# Patient Record
Sex: FEMALE | Race: WHITE | NOT HISPANIC OR LATINO
[De-identification: names, ages, dates, MRNs, and addresses within clinical notes are randomized per-mention and may not be internally consistent; named-entity substitution may affect disease eponyms.]

---

## 2017-05-08 ENCOUNTER — FORM ENCOUNTER (OUTPATIENT)
Age: 72
End: 2017-05-08

## 2017-05-09 ENCOUNTER — APPOINTMENT (OUTPATIENT)
Dept: MRI IMAGING | Facility: CLINIC | Age: 72
End: 2017-05-09

## 2017-05-09 ENCOUNTER — OUTPATIENT (OUTPATIENT)
Dept: OUTPATIENT SERVICES | Facility: HOSPITAL | Age: 72
LOS: 1 days | End: 2017-05-09
Payer: MEDICARE

## 2017-05-09 DIAGNOSIS — R22.1 LOCALIZED SWELLING, MASS AND LUMP, NECK: ICD-10-CM

## 2017-05-09 DIAGNOSIS — R22.0 LOCALIZED SWELLING, MASS AND LUMP, HEAD: ICD-10-CM

## 2017-05-10 ENCOUNTER — RESULT REVIEW (OUTPATIENT)
Age: 72
End: 2017-05-10

## 2017-05-10 PROCEDURE — A9585: CPT

## 2017-05-10 PROCEDURE — 70553 MRI BRAIN STEM W/O & W/DYE: CPT

## 2017-05-10 PROCEDURE — 82565 ASSAY OF CREATININE: CPT

## 2017-06-16 ENCOUNTER — APPOINTMENT (OUTPATIENT)
Dept: PULMONOLOGY | Facility: CLINIC | Age: 72
End: 2017-06-16

## 2017-06-16 VITALS — DIASTOLIC BLOOD PRESSURE: 78 MMHG | BODY MASS INDEX: 25.02 KG/M2 | WEIGHT: 155 LBS | SYSTOLIC BLOOD PRESSURE: 120 MMHG

## 2017-12-18 ENCOUNTER — APPOINTMENT (OUTPATIENT)
Dept: PULMONOLOGY | Facility: CLINIC | Age: 72
End: 2017-12-18
Payer: MEDICARE

## 2017-12-18 VITALS — DIASTOLIC BLOOD PRESSURE: 78 MMHG | SYSTOLIC BLOOD PRESSURE: 118 MMHG

## 2017-12-18 VITALS — WEIGHT: 153 LBS | BODY MASS INDEX: 24.7 KG/M2

## 2017-12-18 DIAGNOSIS — I51.81 TAKOTSUBO SYNDROME: ICD-10-CM

## 2017-12-18 PROCEDURE — 94060 EVALUATION OF WHEEZING: CPT

## 2017-12-18 PROCEDURE — 94729 DIFFUSING CAPACITY: CPT

## 2017-12-18 PROCEDURE — 85018 HEMOGLOBIN: CPT | Mod: QW

## 2017-12-18 PROCEDURE — 94727 GAS DIL/WSHOT DETER LNG VOL: CPT

## 2017-12-18 PROCEDURE — 99215 OFFICE O/P EST HI 40 MIN: CPT | Mod: 25

## 2017-12-18 PROCEDURE — 94664 DEMO&/EVAL PT USE INHALER: CPT | Mod: 59

## 2018-04-30 ENCOUNTER — APPOINTMENT (OUTPATIENT)
Dept: PULMONOLOGY | Facility: CLINIC | Age: 73
End: 2018-04-30
Payer: MEDICARE

## 2018-04-30 VITALS
RESPIRATION RATE: 14 BRPM | HEART RATE: 67 BPM | SYSTOLIC BLOOD PRESSURE: 118 MMHG | OXYGEN SATURATION: 97 % | DIASTOLIC BLOOD PRESSURE: 72 MMHG

## 2018-04-30 VITALS — WEIGHT: 149 LBS | BODY MASS INDEX: 24.05 KG/M2

## 2018-04-30 DIAGNOSIS — I51.81 TAKOTSUBO SYNDROME: ICD-10-CM

## 2018-04-30 DIAGNOSIS — J45.901 UNSPECIFIED ASTHMA WITH (ACUTE) EXACERBATION: ICD-10-CM

## 2018-04-30 DIAGNOSIS — R09.82 POSTNASAL DRIP: ICD-10-CM

## 2018-04-30 PROCEDURE — 94010 BREATHING CAPACITY TEST: CPT

## 2018-04-30 PROCEDURE — 99214 OFFICE O/P EST MOD 30 MIN: CPT | Mod: 25

## 2018-05-07 ENCOUNTER — FORM ENCOUNTER (OUTPATIENT)
Age: 73
End: 2018-05-07

## 2018-05-08 ENCOUNTER — RESULT REVIEW (OUTPATIENT)
Age: 73
End: 2018-05-08

## 2018-05-08 ENCOUNTER — APPOINTMENT (OUTPATIENT)
Dept: MRI IMAGING | Facility: CLINIC | Age: 73
End: 2018-05-08
Payer: MEDICARE

## 2018-05-08 ENCOUNTER — OUTPATIENT (OUTPATIENT)
Dept: OUTPATIENT SERVICES | Facility: HOSPITAL | Age: 73
LOS: 1 days | End: 2018-05-08
Payer: MEDICARE

## 2018-05-08 DIAGNOSIS — R22.1 LOCALIZED SWELLING, MASS AND LUMP, NECK: ICD-10-CM

## 2018-05-08 DIAGNOSIS — R22.0 LOCALIZED SWELLING, MASS AND LUMP, HEAD: ICD-10-CM

## 2018-05-08 PROCEDURE — 70553 MRI BRAIN STEM W/O & W/DYE: CPT | Mod: 26

## 2018-05-08 PROCEDURE — A9585: CPT

## 2018-05-08 PROCEDURE — 70553 MRI BRAIN STEM W/O & W/DYE: CPT

## 2018-05-08 PROCEDURE — 82565 ASSAY OF CREATININE: CPT

## 2018-10-19 ENCOUNTER — APPOINTMENT (OUTPATIENT)
Dept: PULMONOLOGY | Facility: CLINIC | Age: 73
End: 2018-10-19
Payer: MEDICARE

## 2018-10-19 VITALS
DIASTOLIC BLOOD PRESSURE: 78 MMHG | RESPIRATION RATE: 14 BRPM | WEIGHT: 154 LBS | OXYGEN SATURATION: 97 % | BODY MASS INDEX: 24.86 KG/M2 | SYSTOLIC BLOOD PRESSURE: 114 MMHG | HEART RATE: 68 BPM

## 2018-10-19 PROCEDURE — 99214 OFFICE O/P EST MOD 30 MIN: CPT

## 2018-10-19 RX ORDER — MULTIVIT-MIN/IRON/FOLIC ACID/K 18-600-40
400 CAPSULE ORAL
Refills: 0 | Status: ACTIVE | COMMUNITY

## 2019-04-24 ENCOUNTER — APPOINTMENT (OUTPATIENT)
Dept: PULMONOLOGY | Facility: CLINIC | Age: 74
End: 2019-04-24

## 2019-05-03 ENCOUNTER — APPOINTMENT (OUTPATIENT)
Dept: PULMONOLOGY | Facility: CLINIC | Age: 74
End: 2019-05-03
Payer: MEDICARE

## 2019-05-03 VITALS — SYSTOLIC BLOOD PRESSURE: 120 MMHG | OXYGEN SATURATION: 95 % | HEART RATE: 70 BPM | DIASTOLIC BLOOD PRESSURE: 70 MMHG

## 2019-05-03 VITALS — HEIGHT: 65 IN | BODY MASS INDEX: 26.33 KG/M2 | WEIGHT: 158 LBS

## 2019-05-03 PROCEDURE — 99214 OFFICE O/P EST MOD 30 MIN: CPT | Mod: 25

## 2019-05-03 PROCEDURE — 94664 DEMO&/EVAL PT USE INHALER: CPT | Mod: 59

## 2019-05-03 PROCEDURE — 94060 EVALUATION OF WHEEZING: CPT

## 2019-05-03 NOTE — REASON FOR VISIT
[Follow-Up] : a follow-up visit [Asthma] : asthma [COPD] : COPD [Shortness of Breath] : shortness of Breath

## 2019-05-03 NOTE — REVIEW OF SYSTEMS
[Dyspnea] : dyspnea [As Noted in HPI] : as noted in HPI [Hypertension] : ~T hypertension [Negative] : Pulmonary Hypertension [FreeTextEntry8] : Baseline

## 2019-05-03 NOTE — PHYSICAL EXAM
[General Appearance - Well Developed] : well developed [General Appearance - Well Nourished] : well nourished [Normal Conjunctiva] : the conjunctiva exhibited no abnormalities [Normal Oropharynx] : normal oropharynx [Neck Appearance] : the appearance of the neck was normal [Neck Cervical Mass (___cm)] : no neck mass was observed [Jugular Venous Distention Increased] : there was no jugular-venous distention [Thyroid Diffuse Enlargement] : the thyroid was not enlarged [Heart Rate And Rhythm] : heart rate and rhythm were normal [Heart Sounds] : normal S1 and S2 [Murmurs] : no murmurs present [Arterial Pulses Normal] : the arterial pulses were normal [Edema] : no peripheral edema present [Respiration, Rhythm And Depth] : normal respiratory rhythm and effort [Auscultation Breath Sounds / Voice Sounds] : lungs were clear to auscultation bilaterally [Exaggerated Use Of Accessory Muscles For Inspiration] : no accessory muscle use [Abdomen Tenderness] : non-tender [Abdomen Soft] : soft [] : no hepato-splenomegaly [Abnormal Walk] : normal gait [Nail Clubbing] : no clubbing of the fingernails [Cyanosis, Localized] : no localized cyanosis [Skin Color & Pigmentation] : normal skin color and pigmentation [Oriented To Time, Place, And Person] : oriented to person, place, and time

## 2019-08-04 ENCOUNTER — FORM ENCOUNTER (OUTPATIENT)
Age: 74
End: 2019-08-04

## 2019-08-05 ENCOUNTER — OUTPATIENT (OUTPATIENT)
Dept: OUTPATIENT SERVICES | Facility: HOSPITAL | Age: 74
LOS: 1 days | End: 2019-08-05
Payer: MEDICARE

## 2019-08-05 ENCOUNTER — APPOINTMENT (OUTPATIENT)
Dept: MRI IMAGING | Facility: CLINIC | Age: 74
End: 2019-08-05
Payer: MEDICARE

## 2019-08-05 DIAGNOSIS — R22.0 LOCALIZED SWELLING, MASS AND LUMP, HEAD: ICD-10-CM

## 2019-08-05 PROCEDURE — A9585: CPT

## 2019-08-05 PROCEDURE — 70553 MRI BRAIN STEM W/O & W/DYE: CPT | Mod: 26

## 2019-08-05 PROCEDURE — 70553 MRI BRAIN STEM W/O & W/DYE: CPT

## 2019-08-09 ENCOUNTER — RESULT REVIEW (OUTPATIENT)
Age: 74
End: 2019-08-09

## 2019-11-06 ENCOUNTER — APPOINTMENT (OUTPATIENT)
Dept: PULMONOLOGY | Facility: CLINIC | Age: 74
End: 2019-11-06
Payer: MEDICARE

## 2019-11-06 VITALS — OXYGEN SATURATION: 97 % | DIASTOLIC BLOOD PRESSURE: 78 MMHG | HEART RATE: 73 BPM | SYSTOLIC BLOOD PRESSURE: 110 MMHG

## 2019-11-06 VITALS — HEIGHT: 65 IN | WEIGHT: 151 LBS | BODY MASS INDEX: 25.16 KG/M2

## 2019-11-06 PROCEDURE — 99214 OFFICE O/P EST MOD 30 MIN: CPT | Mod: 25

## 2019-11-06 PROCEDURE — 94010 BREATHING CAPACITY TEST: CPT

## 2019-11-06 RX ORDER — HALOBETASOL PROPIONATE 0.5 MG/G
0.05 OINTMENT TOPICAL
Qty: 45 | Refills: 0 | Status: DISCONTINUED | COMMUNITY
Start: 2017-01-24 | End: 2019-11-06

## 2019-11-06 NOTE — PHYSICAL EXAM
[General Appearance - Well Developed] : well developed [General Appearance - Well Nourished] : well nourished [Normal Conjunctiva] : the conjunctiva exhibited no abnormalities [Normal Oropharynx] : normal oropharynx [Neck Appearance] : the appearance of the neck was normal [Neck Cervical Mass (___cm)] : no neck mass was observed [Jugular Venous Distention Increased] : there was no jugular-venous distention [Thyroid Diffuse Enlargement] : the thyroid was not enlarged [Heart Rate And Rhythm] : heart rate and rhythm were normal [Heart Sounds] : normal S1 and S2 [Murmurs] : no murmurs present [Arterial Pulses Normal] : the arterial pulses were normal [Edema] : no peripheral edema present [Respiration, Rhythm And Depth] : normal respiratory rhythm and effort [Exaggerated Use Of Accessory Muscles For Inspiration] : no accessory muscle use [Auscultation Breath Sounds / Voice Sounds] : lungs were clear to auscultation bilaterally [Abdomen Soft] : soft [Abdomen Tenderness] : non-tender [] : no hepato-splenomegaly [Abnormal Walk] : normal gait [Nail Clubbing] : no clubbing of the fingernails [Cyanosis, Localized] : no localized cyanosis [Skin Color & Pigmentation] : normal skin color and pigmentation [Oriented To Time, Place, And Person] : oriented to person, place, and time

## 2019-11-06 NOTE — REVIEW OF SYSTEMS
[Dyspnea] : dyspnea [As Noted in HPI] : as noted in HPI [Hypertension] : ~T hypertension [Negative] : Sleep Disorder [FreeTextEntry8] : Baseline

## 2020-06-22 ENCOUNTER — APPOINTMENT (OUTPATIENT)
Dept: PULMONOLOGY | Facility: CLINIC | Age: 75
End: 2020-06-22
Payer: MEDICARE

## 2020-06-22 VITALS
BODY MASS INDEX: 25.29 KG/M2 | DIASTOLIC BLOOD PRESSURE: 64 MMHG | WEIGHT: 152 LBS | OXYGEN SATURATION: 98 % | HEART RATE: 67 BPM | SYSTOLIC BLOOD PRESSURE: 108 MMHG

## 2020-06-22 PROCEDURE — 99213 OFFICE O/P EST LOW 20 MIN: CPT

## 2020-06-22 RX ORDER — LEVOCETIRIZINE DIHYDROCHLORIDE 5 MG/1
5 TABLET ORAL
Qty: 90 | Refills: 0 | Status: DISCONTINUED | COMMUNITY
Start: 2017-01-24 | End: 2020-06-22

## 2020-06-22 RX ORDER — TRIAMCINOLONE ACETONIDE 1 MG/ML
0.1 LOTION TOPICAL
Qty: 60 | Refills: 0 | Status: DISCONTINUED | COMMUNITY
Start: 2016-12-16 | End: 2020-06-22

## 2020-06-22 NOTE — PHYSICAL EXAM
[No Acute Distress] : no acute distress [Normal Oropharynx] : normal oropharynx [No Neck Mass] : no neck mass [Normal Rate/Rhythm] : normal rate/rhythm [Normal Appearance] : normal appearance [Normal S1, S2] : normal s1, s2 [No Resp Distress] : no resp distress [No Murmurs] : no murmurs [No Abnormalities] : no abnormalities [Benign] : benign [Clear to Auscultation Bilaterally] : clear to auscultation bilaterally [Normal Gait] : normal gait [No Clubbing] : no clubbing [FROM] : FROM [No Edema] : no edema [No Cyanosis] : no cyanosis [No Focal Deficits] : no focal deficits [Normal Color/ Pigmentation] : normal color/ pigmentation [Oriented x3] : oriented x3 [Normal Affect] : normal affect

## 2020-12-01 DIAGNOSIS — Z01.818 ENCOUNTER FOR OTHER PREPROCEDURAL EXAMINATION: ICD-10-CM

## 2020-12-04 ENCOUNTER — APPOINTMENT (OUTPATIENT)
Dept: DISASTER EMERGENCY | Facility: CLINIC | Age: 75
End: 2020-12-04

## 2020-12-05 LAB — SARS-COV-2 N GENE NPH QL NAA+PROBE: NOT DETECTED

## 2020-12-08 ENCOUNTER — APPOINTMENT (OUTPATIENT)
Dept: PULMONOLOGY | Facility: CLINIC | Age: 75
End: 2020-12-08
Payer: MEDICARE

## 2020-12-08 VITALS — HEART RATE: 74 BPM | DIASTOLIC BLOOD PRESSURE: 80 MMHG | SYSTOLIC BLOOD PRESSURE: 120 MMHG | OXYGEN SATURATION: 99 %

## 2020-12-08 VITALS — BODY MASS INDEX: 25.33 KG/M2 | HEIGHT: 65 IN | WEIGHT: 152 LBS

## 2020-12-08 DIAGNOSIS — Z23 ENCOUNTER FOR IMMUNIZATION: ICD-10-CM

## 2020-12-08 PROCEDURE — 94010 BREATHING CAPACITY TEST: CPT

## 2020-12-08 PROCEDURE — 99214 OFFICE O/P EST MOD 30 MIN: CPT | Mod: 25

## 2020-12-08 RX ORDER — MUPIROCIN 20 MG/G
2 OINTMENT TOPICAL
Qty: 22 | Refills: 0 | Status: DISCONTINUED | COMMUNITY
Start: 2020-09-07

## 2020-12-08 RX ORDER — LEVOTHYROXINE SODIUM 0.07 MG/1
75 TABLET ORAL
Qty: 90 | Refills: 0 | Status: ACTIVE | COMMUNITY
Start: 2020-06-11

## 2020-12-08 RX ORDER — OMEPRAZOLE 40 MG/1
40 CAPSULE, DELAYED RELEASE ORAL
Qty: 90 | Refills: 0 | Status: ACTIVE | COMMUNITY
Start: 2020-06-11

## 2020-12-08 RX ORDER — CEPHALEXIN 500 MG/1
500 CAPSULE ORAL
Qty: 21 | Refills: 0 | Status: DISCONTINUED | COMMUNITY
Start: 2020-09-07

## 2020-12-08 RX ORDER — GABAPENTIN 100 MG/1
100 CAPSULE ORAL
Qty: 90 | Refills: 0 | Status: DISCONTINUED | COMMUNITY
Start: 2020-06-11

## 2020-12-08 RX ORDER — CLOBETASOL PROPIONATE 0.5 MG/G
0.05 CREAM TOPICAL
Qty: 15 | Refills: 0 | Status: DISCONTINUED | COMMUNITY
Start: 2020-10-05

## 2020-12-08 RX ORDER — FLUCONAZOLE 150 MG/1
150 TABLET ORAL
Qty: 1 | Refills: 0 | Status: DISCONTINUED | COMMUNITY
Start: 2020-08-24

## 2020-12-08 RX ORDER — RAMIPRIL 10 MG/1
10 CAPSULE ORAL
Qty: 90 | Refills: 0 | Status: ACTIVE | COMMUNITY
Start: 2020-06-11

## 2021-06-15 ENCOUNTER — APPOINTMENT (OUTPATIENT)
Dept: PULMONOLOGY | Facility: CLINIC | Age: 76
End: 2021-06-15
Payer: MEDICARE

## 2021-06-15 VITALS
HEART RATE: 69 BPM | SYSTOLIC BLOOD PRESSURE: 114 MMHG | DIASTOLIC BLOOD PRESSURE: 70 MMHG | OXYGEN SATURATION: 96 % | WEIGHT: 148 LBS | BODY MASS INDEX: 24.63 KG/M2

## 2021-06-15 PROCEDURE — 99213 OFFICE O/P EST LOW 20 MIN: CPT

## 2021-08-12 ENCOUNTER — RESULT REVIEW (OUTPATIENT)
Age: 76
End: 2021-08-12

## 2021-08-12 ENCOUNTER — APPOINTMENT (OUTPATIENT)
Dept: MRI IMAGING | Facility: CLINIC | Age: 76
End: 2021-08-12
Payer: MEDICARE

## 2021-08-12 ENCOUNTER — OUTPATIENT (OUTPATIENT)
Dept: OUTPATIENT SERVICES | Facility: HOSPITAL | Age: 76
LOS: 1 days | End: 2021-08-12
Payer: MEDICARE

## 2021-08-12 DIAGNOSIS — R22.0 LOCALIZED SWELLING, MASS AND LUMP, HEAD: ICD-10-CM

## 2021-08-12 PROCEDURE — A9585: CPT

## 2021-08-12 PROCEDURE — 70553 MRI BRAIN STEM W/O & W/DYE: CPT | Mod: MH

## 2021-08-12 PROCEDURE — 70553 MRI BRAIN STEM W/O & W/DYE: CPT | Mod: 26,MH

## 2021-08-20 DIAGNOSIS — R22.1 LOCALIZED SWELLING, MASS AND LUMP, HEAD: ICD-10-CM

## 2021-08-20 DIAGNOSIS — R22.0 LOCALIZED SWELLING, MASS AND LUMP, HEAD: ICD-10-CM

## 2021-12-01 ENCOUNTER — APPOINTMENT (OUTPATIENT)
Dept: PULMONOLOGY | Facility: CLINIC | Age: 76
End: 2021-12-01
Payer: MEDICARE

## 2021-12-01 VITALS
WEIGHT: 142 LBS | SYSTOLIC BLOOD PRESSURE: 138 MMHG | DIASTOLIC BLOOD PRESSURE: 80 MMHG | OXYGEN SATURATION: 98 % | HEART RATE: 78 BPM | RESPIRATION RATE: 16 BRPM | BODY MASS INDEX: 22.82 KG/M2 | HEIGHT: 66 IN

## 2021-12-01 DIAGNOSIS — R06.2 WHEEZING: ICD-10-CM

## 2021-12-01 DIAGNOSIS — J20.9 ACUTE BRONCHITIS, UNSPECIFIED: ICD-10-CM

## 2021-12-01 PROCEDURE — 99214 OFFICE O/P EST MOD 30 MIN: CPT

## 2021-12-01 NOTE — HISTORY OF PRESENT ILLNESS
[Former] : former [Current] : current [TextBox_4] : Hx COPD.\par \par Was previously seeing Dr Casetllanos. \par \par On breo daily; spiriva intermitently due to cost. \par \par Was feeling well until about one week ago, developed URI symptoms with cough. No fever. Some wheeze. \par \par Currently vaping. [YearQuit] : 2012

## 2021-12-01 NOTE — PHYSICAL EXAM
[No Acute Distress] : no acute distress [Normal Oropharynx] : normal oropharynx [Supple] : supple [Normal Rate/Rhythm] : normal rate/rhythm [Normal S1, S2] : normal s1, s2 [Benign] : benign [No Clubbing] : no clubbing [Normal Color/ Pigmentation] : normal color/ pigmentation [Oriented x3] : oriented x3 [Normal Mood] : normal mood [Normal Affect] : normal affect [TextBox_68] : scattered wheeze

## 2021-12-13 ENCOUNTER — APPOINTMENT (OUTPATIENT)
Dept: PULMONOLOGY | Facility: CLINIC | Age: 76
End: 2021-12-13

## 2022-06-01 LAB — SARS-COV-2 N GENE NPH QL NAA+PROBE: NOT DETECTED

## 2022-06-03 ENCOUNTER — APPOINTMENT (OUTPATIENT)
Dept: PULMONOLOGY | Facility: CLINIC | Age: 77
End: 2022-06-03
Payer: MEDICARE

## 2022-06-03 VITALS
RESPIRATION RATE: 16 BRPM | HEART RATE: 83 BPM | DIASTOLIC BLOOD PRESSURE: 80 MMHG | OXYGEN SATURATION: 98 % | SYSTOLIC BLOOD PRESSURE: 118 MMHG

## 2022-06-03 VITALS — BODY MASS INDEX: 22.99 KG/M2 | WEIGHT: 138 LBS | HEIGHT: 65 IN

## 2022-06-03 DIAGNOSIS — J45.909 UNSPECIFIED ASTHMA, UNCOMPLICATED: ICD-10-CM

## 2022-06-03 PROCEDURE — 94727 GAS DIL/WSHOT DETER LNG VOL: CPT

## 2022-06-03 PROCEDURE — 99214 OFFICE O/P EST MOD 30 MIN: CPT | Mod: 25

## 2022-06-03 PROCEDURE — 94010 BREATHING CAPACITY TEST: CPT

## 2022-06-03 PROCEDURE — 94729 DIFFUSING CAPACITY: CPT

## 2022-06-03 PROCEDURE — 85018 HEMOGLOBIN: CPT | Mod: QW

## 2022-12-05 ENCOUNTER — APPOINTMENT (OUTPATIENT)
Dept: PULMONOLOGY | Facility: CLINIC | Age: 77
End: 2022-12-05

## 2022-12-05 VITALS
WEIGHT: 140 LBS | RESPIRATION RATE: 16 BRPM | HEART RATE: 73 BPM | SYSTOLIC BLOOD PRESSURE: 132 MMHG | OXYGEN SATURATION: 97 % | HEIGHT: 65.5 IN | BODY MASS INDEX: 23.04 KG/M2 | DIASTOLIC BLOOD PRESSURE: 66 MMHG

## 2022-12-05 DIAGNOSIS — Z78.9 OTHER SPECIFIED HEALTH STATUS: ICD-10-CM

## 2022-12-05 DIAGNOSIS — Z87.898 PERSONAL HISTORY OF OTHER SPECIFIED CONDITIONS: ICD-10-CM

## 2022-12-05 DIAGNOSIS — Z86.69 PERSONAL HISTORY OF OTHER DISEASES OF THE NERVOUS SYSTEM AND SENSE ORGANS: ICD-10-CM

## 2022-12-05 PROCEDURE — 99214 OFFICE O/P EST MOD 30 MIN: CPT

## 2022-12-05 RX ORDER — TIOTROPIUM BROMIDE INHALATION SPRAY 3.12 UG/1
2.5 SPRAY, METERED RESPIRATORY (INHALATION)
Refills: 0 | Status: DISCONTINUED | COMMUNITY
End: 2022-12-05

## 2022-12-05 NOTE — HISTORY OF PRESENT ILLNESS
[Former] : former [Current] : current [TextBox_4] : Hx COPD.\par \par Was previously seeing Dr Castellanos. \par \par On breo daily; not longer taking spiriva. \par \par At baseline. MARIANO with only heavier exertion. No change. No fever. Some wheeze. \par \par Got epidural for sciatic nerve pain about 3 weeks ago. \par \par Currently vaping.\par \par Not exercising. \par \par Still does not want LDCT lung cancer screening. [YearQuit] : 2012

## 2024-01-17 ENCOUNTER — APPOINTMENT (OUTPATIENT)
Dept: PULMONOLOGY | Facility: CLINIC | Age: 79
End: 2024-01-17
Payer: MEDICARE

## 2024-01-17 VITALS — BODY MASS INDEX: 21.83 KG/M2 | HEIGHT: 65 IN | WEIGHT: 131 LBS

## 2024-01-17 VITALS
SYSTOLIC BLOOD PRESSURE: 128 MMHG | OXYGEN SATURATION: 97 % | RESPIRATION RATE: 16 BRPM | HEART RATE: 71 BPM | DIASTOLIC BLOOD PRESSURE: 70 MMHG

## 2024-01-17 DIAGNOSIS — Z87.891 PERSONAL HISTORY OF NICOTINE DEPENDENCE: ICD-10-CM

## 2024-01-17 DIAGNOSIS — J44.9 CHRONIC OBSTRUCTIVE PULMONARY DISEASE, UNSPECIFIED: ICD-10-CM

## 2024-01-17 DIAGNOSIS — R06.02 SHORTNESS OF BREATH: ICD-10-CM

## 2024-01-17 PROCEDURE — G0296 VISIT TO DETERM LDCT ELIG: CPT

## 2024-01-17 PROCEDURE — 99214 OFFICE O/P EST MOD 30 MIN: CPT | Mod: 25

## 2024-01-17 PROCEDURE — 94010 BREATHING CAPACITY TEST: CPT

## 2024-01-17 RX ORDER — GABAPENTIN 300 MG/1
300 CAPSULE ORAL
Refills: 0 | Status: ACTIVE | COMMUNITY

## 2024-01-17 RX ORDER — GABAPENTIN 100 MG
100 TABLET ORAL
Refills: 0 | Status: DISCONTINUED | COMMUNITY
End: 2024-01-17

## 2024-01-17 RX ORDER — BERBERINE CHLOR/SEAWEED/CHROM 500-250 MG
500 CAPSULE ORAL
Refills: 0 | Status: ACTIVE | COMMUNITY

## 2024-01-17 RX ORDER — ASCORBIC ACID 125 MG
TABLET,CHEWABLE ORAL
Refills: 0 | Status: ACTIVE | COMMUNITY

## 2024-01-17 NOTE — HISTORY OF PRESENT ILLNESS
[Former] : former [>= 20 pack years] : >= 20 pack years [TextBox_4] : Hx COPD.  Since last visit here, had covid Dec 2022; saw PCP; cxr done and no pna she reports. Again had another episode of bronchitis in Oct 2023. Went to ; gave abx and eye drops and nasal spray. Had sob and cough that lasted a few weeks but resolved.   Now feeling well. MARIANO only with heavy exertion.   On breo daily; not longer taking spiriva.   Stopped smoking 2010. Now still vaping.  Not exercising.   She had not wanted to do LDCT lung cancer screening. Now agrees to consider.

## 2024-01-17 NOTE — PLAN
[TextEntry] : Continue breo. Albuterol prn. Again, reviewed benefits and risk of LDCT lung cancer screening.  She will schedule it. She must stop vaping; counseled about quitting. She wants to go to yearly visits; PFT next visit.

## 2024-08-22 ENCOUNTER — APPOINTMENT (OUTPATIENT)
Dept: PULMONOLOGY | Facility: CLINIC | Age: 79
End: 2024-08-22
Payer: MEDICARE

## 2024-08-22 VITALS — HEIGHT: 64 IN | BODY MASS INDEX: 21.51 KG/M2 | WEIGHT: 126 LBS

## 2024-08-22 PROCEDURE — 94010 BREATHING CAPACITY TEST: CPT

## 2024-08-23 ENCOUNTER — APPOINTMENT (OUTPATIENT)
Dept: PULMONOLOGY | Facility: CLINIC | Age: 79
End: 2024-08-23
Payer: MEDICARE

## 2024-08-23 VITALS
DIASTOLIC BLOOD PRESSURE: 68 MMHG | RESPIRATION RATE: 16 BRPM | SYSTOLIC BLOOD PRESSURE: 128 MMHG | HEART RATE: 76 BPM | OXYGEN SATURATION: 97 %

## 2024-08-23 DIAGNOSIS — Z87.891 PERSONAL HISTORY OF NICOTINE DEPENDENCE: ICD-10-CM

## 2024-08-23 DIAGNOSIS — J44.9 CHRONIC OBSTRUCTIVE PULMONARY DISEASE, UNSPECIFIED: ICD-10-CM

## 2024-08-23 PROCEDURE — 99215 OFFICE O/P EST HI 40 MIN: CPT

## 2024-08-23 NOTE — HISTORY OF PRESENT ILLNESS
[TextBox_4] : 79 yo woman comes for  preop evaluation prior to surgery Smoker until 2010 , now vaping but also smoking again Had LCS in January which showed Lung RADS 2 Taking Breo  daily for COPD, no use of her rescue inhalers  HT on ramipril metoprolol, crestor , ASA  PAtient has no dyspnea, no cough, nosputum

## 2024-08-23 NOTE — PROCEDURE
[FreeTextEntry1] : Spirol FVC 2.94 108% FEV1 1.69 80% FEV1% 57% Midexpir flow 26% Consistent with obstructive disease but stable from previous studies

## 2024-08-23 NOTE — ASSESSMENT
[FreeTextEntry1] : 77 yo woman comes for  preop evaluation prior to surgery Smoker until 2010 , now vaping but also smoking again Had LCS in January which showed Lung RADS 2 Taking Breo  daily for COPD, no use of her rescue inhalers  HT on ramipril metoprolol, crestor , ASA  Patient has no dyspnea, no cough, no sputum  Cardiac w/u preop in progress with Dr Varma  Imp 77 yo woman , smoker , has started smoking again with COPD Managed well on Breo daily with prn albuterol Spirometry shows obstruction but no change since Jan 2024 Her risk of anesthesia and surgery for pulmonary complications is slightly elevated but not prohibitve There should be no contraindication to planned orthopedic surgery from a pulmonary standpoint  Patient will see Dr Eduardo in January

## 2024-08-23 NOTE — CONSULT LETTER
[Dear  ___] : Dear  [unfilled], [FreeTextEntry1] : I had the pleasure of evaluating your patient, FATOU CHRISTOPHER , in the office today.  Please review my consultation and evaluation report that follows below.  Please do not hesitate to call me if further information is necessary or if you wish to discuss ongoing care or diagnostic work-up.    I very much appreciate your referral and it is a privilege to be able to provide care for your patient.  Sincerely,   Kadeem Easley MD, MHCM, FACP, DO-C Pulmonary Medicine  of Medicine Fran evelyne Cheek [facility] [employer] at [facility] [email or web address] -[facility] in [address] [place]  [address]    Fax   [facility] at [address]  [phone]

## 2024-09-05 RX ORDER — ACETAMINOPHEN 500 MG/5ML
975 LIQUID (ML) ORAL ONCE
Refills: 0 | Status: COMPLETED | OUTPATIENT
Start: 2024-09-06 | End: 2024-09-06

## 2024-09-05 RX ORDER — CEFAZOLIN SODIUM IN 0.9 % NACL 3 G/100 ML
2000 INTRAVENOUS SOLUTION, PIGGYBACK (ML) INTRAVENOUS ONCE
Refills: 0 | Status: DISCONTINUED | OUTPATIENT
Start: 2024-09-06 | End: 2024-09-06

## 2024-09-06 ENCOUNTER — TRANSCRIPTION ENCOUNTER (OUTPATIENT)
Age: 79
End: 2024-09-06

## 2024-09-06 ENCOUNTER — INPATIENT (INPATIENT)
Facility: HOSPITAL | Age: 79
LOS: 0 days | Discharge: HOME | DRG: 552 | End: 2024-09-07
Attending: NEUROLOGICAL SURGERY | Admitting: NEUROLOGICAL SURGERY
Payer: COMMERCIAL

## 2024-09-06 VITALS
RESPIRATION RATE: 16 BRPM | OXYGEN SATURATION: 100 % | TEMPERATURE: 98 F | SYSTOLIC BLOOD PRESSURE: 157 MMHG | HEART RATE: 67 BPM | DIASTOLIC BLOOD PRESSURE: 81 MMHG

## 2024-09-06 DIAGNOSIS — M48.062 SPINAL STENOSIS, LUMBAR REGION WITH NEUROGENIC CLAUDICATION: ICD-10-CM

## 2024-09-06 LAB
ABO RH CONFIRMATION: SIGNIFICANT CHANGE UP
BLD GP AB SCN SERPL QL: SIGNIFICANT CHANGE UP
GLUCOSE BLDC GLUCOMTR-MCNC: 103 MG/DL — HIGH (ref 70–99)
GLUCOSE BLDC GLUCOMTR-MCNC: 56 MG/DL — LOW (ref 70–99)
GLUCOSE BLDC GLUCOMTR-MCNC: 81 MG/DL — SIGNIFICANT CHANGE UP (ref 70–99)

## 2024-09-06 DEVICE — SURGIFLO MATRIX WITH THROMBIN KIT: Type: IMPLANTABLE DEVICE | Status: FUNCTIONAL

## 2024-09-06 DEVICE — SURGIFOAM 8 X 12.5CM X 10MM (100): Type: IMPLANTABLE DEVICE | Status: FUNCTIONAL

## 2024-09-06 RX ORDER — LEVOTHYROXINE SODIUM 300 MCG
1 TABLET ORAL
Refills: 0 | DISCHARGE

## 2024-09-06 RX ORDER — METOPROLOL SUCCINATE 50 MG/1
12.5 TABLET, EXTENDED RELEASE ORAL DAILY
Refills: 0 | Status: DISCONTINUED | OUTPATIENT
Start: 2024-09-06 | End: 2024-09-06

## 2024-09-06 RX ORDER — ALBUTEROL SULFATE 2.5 MG/3ML
0 VIAL, NEBULIZER (ML) INHALATION
Refills: 0 | DISCHARGE

## 2024-09-06 RX ORDER — OMEPRAZOLE 20 MG/1
1 CAPSULE, DELAYED RELEASE ORAL
Refills: 0 | DISCHARGE

## 2024-09-06 RX ORDER — METOPROLOL SUCCINATE 50 MG/1
25 TABLET, EXTENDED RELEASE ORAL DAILY
Refills: 0 | Status: DISCONTINUED | OUTPATIENT
Start: 2024-09-06 | End: 2024-09-07

## 2024-09-06 RX ORDER — RAMIPRIL 2.5 MG/1
0 CAPSULE ORAL
Refills: 0 | DISCHARGE

## 2024-09-06 RX ORDER — OMEGA-3-ACID ETHYL ESTERS CAPSULES 1 G/1
0 CAPSULE, LIQUID FILLED ORAL
Refills: 0 | DISCHARGE

## 2024-09-06 RX ORDER — FENTANYL CITRATE-0.9 % NACL/PF 100MCG/2ML
25 SYRINGE (ML) INTRAVENOUS
Refills: 0 | Status: DISCONTINUED | OUTPATIENT
Start: 2024-09-06 | End: 2024-09-06

## 2024-09-06 RX ORDER — MAGNESIUM OXIDE 400 MG
1 TABLET ORAL
Refills: 0 | DISCHARGE

## 2024-09-06 RX ORDER — GABAPENTIN 400 MG/1
300 CAPSULE ORAL ONCE
Refills: 0 | Status: COMPLETED | OUTPATIENT
Start: 2024-09-06 | End: 2024-09-06

## 2024-09-06 RX ORDER — FLUTICASONE FUROATE AND VILANTEROL TRIFENATATE 100; 25 UG/1; UG/1
1 POWDER RESPIRATORY (INHALATION)
Refills: 0 | DISCHARGE

## 2024-09-06 RX ORDER — DEXAMETHASONE 0.5 MG/1
4 TABLET ORAL EVERY 6 HOURS
Refills: 0 | Status: COMPLETED | OUTPATIENT
Start: 2024-09-06 | End: 2024-09-07

## 2024-09-06 RX ORDER — LEVOTHYROXINE SODIUM 300 MCG
75 TABLET ORAL DAILY
Refills: 0 | Status: DISCONTINUED | OUTPATIENT
Start: 2024-09-06 | End: 2024-09-07

## 2024-09-06 RX ORDER — ROSUVASTATIN CALCIUM 20 MG/1
1 TABLET, FILM COATED ORAL
Refills: 0 | DISCHARGE

## 2024-09-06 RX ORDER — APREPITANT 40 MG/1
40 CAPSULE ORAL ONCE
Refills: 0 | Status: COMPLETED | OUTPATIENT
Start: 2024-09-06 | End: 2024-09-06

## 2024-09-06 RX ORDER — ACETAMINOPHEN 500 MG/5ML
650 LIQUID (ML) ORAL EVERY 6 HOURS
Refills: 0 | Status: DISCONTINUED | OUTPATIENT
Start: 2024-09-06 | End: 2024-09-07

## 2024-09-06 RX ORDER — CEFAZOLIN SODIUM IN 0.9 % NACL 3 G/100 ML
2000 INTRAVENOUS SOLUTION, PIGGYBACK (ML) INTRAVENOUS EVERY 8 HOURS
Refills: 0 | Status: COMPLETED | OUTPATIENT
Start: 2024-09-06 | End: 2024-09-07

## 2024-09-06 RX ORDER — OXYCODONE HYDROCHLORIDE 30 MG/1
5 TABLET ORAL EVERY 6 HOURS
Refills: 0 | Status: DISCONTINUED | OUTPATIENT
Start: 2024-09-06 | End: 2024-09-07

## 2024-09-06 RX ORDER — CYCLOBENZAPRINE HYDROCHLORIDE 15 MG/1
10 CAPSULE, EXTENDED RELEASE ORAL EVERY 8 HOURS
Refills: 0 | Status: DISCONTINUED | OUTPATIENT
Start: 2024-09-06 | End: 2024-09-07

## 2024-09-06 RX ORDER — HYDROMORPHONE/SOD CHLOR,ISO/PF 2 MG/10 ML
0.5 SYRINGE (ML) INJECTION
Refills: 0 | Status: DISCONTINUED | OUTPATIENT
Start: 2024-09-06 | End: 2024-09-06

## 2024-09-06 RX ORDER — CALCIUM CARBONATE 750 MG/1
2 TABLET ORAL
Refills: 0 | DISCHARGE

## 2024-09-06 RX ORDER — MONTELUKAST SODIUM 10 MG/1
1 TABLET ORAL
Refills: 0 | DISCHARGE

## 2024-09-06 RX ORDER — ONDANSETRON HCL/PF 4 MG/2 ML
4 VIAL (ML) INJECTION EVERY 6 HOURS
Refills: 0 | Status: DISCONTINUED | OUTPATIENT
Start: 2024-09-06 | End: 2024-09-07

## 2024-09-06 RX ORDER — ONDANSETRON HCL/PF 4 MG/2 ML
4 VIAL (ML) INJECTION ONCE
Refills: 0 | Status: DISCONTINUED | OUTPATIENT
Start: 2024-09-06 | End: 2024-09-06

## 2024-09-06 RX ORDER — FOLIC ACID 1 MG/1
0 TABLET ORAL
Refills: 0 | DISCHARGE

## 2024-09-06 RX ORDER — CYANOCOBALAMIN 1000 UG/ML
0 INJECTION INTRAMUSCULAR; SUBCUTANEOUS
Refills: 0 | DISCHARGE

## 2024-09-06 RX ORDER — CEFAZOLIN SODIUM IN 0.9 % NACL 3 G/100 ML
2000 INTRAVENOUS SOLUTION, PIGGYBACK (ML) INTRAVENOUS EVERY 8 HOURS
Refills: 0 | Status: DISCONTINUED | OUTPATIENT
Start: 2024-09-06 | End: 2024-09-06

## 2024-09-06 RX ORDER — HYDROMORPHONE/SOD CHLOR,ISO/PF 2 MG/10 ML
1 SYRINGE (ML) INJECTION EVERY 6 HOURS
Refills: 0 | Status: DISCONTINUED | OUTPATIENT
Start: 2024-09-06 | End: 2024-09-07

## 2024-09-06 RX ORDER — GABAPENTIN 400 MG/1
1 CAPSULE ORAL
Refills: 0 | DISCHARGE

## 2024-09-06 RX ORDER — LISINOPRIL 5 MG/1
40 TABLET ORAL DAILY
Refills: 0 | Status: DISCONTINUED | OUTPATIENT
Start: 2024-09-06 | End: 2024-09-07

## 2024-09-06 RX ORDER — OXYCODONE HYDROCHLORIDE 30 MG/1
10 TABLET ORAL EVERY 6 HOURS
Refills: 0 | Status: DISCONTINUED | OUTPATIENT
Start: 2024-09-06 | End: 2024-09-07

## 2024-09-06 RX ORDER — GABAPENTIN 400 MG/1
300 CAPSULE ORAL DAILY
Refills: 0 | Status: DISCONTINUED | OUTPATIENT
Start: 2024-09-06 | End: 2024-09-07

## 2024-09-06 RX ORDER — METOPROLOL SUCCINATE 50 MG/1
1 TABLET, EXTENDED RELEASE ORAL
Refills: 0 | DISCHARGE

## 2024-09-06 RX ORDER — ASPIRIN 325 MG
1 TABLET ORAL
Refills: 0 | DISCHARGE

## 2024-09-06 RX ORDER — MONTELUKAST SODIUM 10 MG/1
10 TABLET ORAL DAILY
Refills: 0 | Status: DISCONTINUED | OUTPATIENT
Start: 2024-09-06 | End: 2024-09-07

## 2024-09-06 RX ORDER — ROSUVASTATIN CALCIUM 20 MG/1
10 TABLET, FILM COATED ORAL AT BEDTIME
Refills: 0 | Status: DISCONTINUED | OUTPATIENT
Start: 2024-09-06 | End: 2024-09-07

## 2024-09-06 RX ORDER — ALBUTEROL SULFATE 2.5 MG/3ML
2 VIAL, NEBULIZER (ML) INHALATION EVERY 6 HOURS
Refills: 0 | Status: DISCONTINUED | OUTPATIENT
Start: 2024-09-06 | End: 2024-09-07

## 2024-09-06 RX ADMIN — Medication 65 MILLILITER(S): at 14:25

## 2024-09-06 RX ADMIN — Medication 2000 MILLIGRAM(S): at 20:10

## 2024-09-06 RX ADMIN — Medication 3 MILLILITER(S): at 09:57

## 2024-09-06 RX ADMIN — GABAPENTIN 300 MILLIGRAM(S): 400 CAPSULE ORAL at 10:22

## 2024-09-06 RX ADMIN — Medication 650 MILLIGRAM(S): at 23:16

## 2024-09-06 RX ADMIN — DEXAMETHASONE 4 MILLIGRAM(S): 0.5 TABLET ORAL at 17:49

## 2024-09-06 RX ADMIN — Medication 650 MILLIGRAM(S): at 17:50

## 2024-09-06 RX ADMIN — CYCLOBENZAPRINE HYDROCHLORIDE 10 MILLIGRAM(S): 15 CAPSULE, EXTENDED RELEASE ORAL at 21:12

## 2024-09-06 RX ADMIN — CYCLOBENZAPRINE HYDROCHLORIDE 10 MILLIGRAM(S): 15 CAPSULE, EXTENDED RELEASE ORAL at 14:24

## 2024-09-06 RX ADMIN — ROSUVASTATIN CALCIUM 10 MILLIGRAM(S): 20 TABLET, FILM COATED ORAL at 21:12

## 2024-09-06 RX ADMIN — Medication 650 MILLIGRAM(S): at 18:50

## 2024-09-06 RX ADMIN — APREPITANT 40 MILLIGRAM(S): 40 CAPSULE ORAL at 09:44

## 2024-09-06 RX ADMIN — DEXAMETHASONE 4 MILLIGRAM(S): 0.5 TABLET ORAL at 23:16

## 2024-09-07 ENCOUNTER — TRANSCRIPTION ENCOUNTER (OUTPATIENT)
Age: 79
End: 2024-09-07

## 2024-09-07 VITALS
HEART RATE: 73 BPM | RESPIRATION RATE: 18 BRPM | TEMPERATURE: 98 F | SYSTOLIC BLOOD PRESSURE: 109 MMHG | DIASTOLIC BLOOD PRESSURE: 65 MMHG | OXYGEN SATURATION: 93 %

## 2024-09-07 LAB
ANION GAP SERPL CALC-SCNC: 12 MMOL/L — SIGNIFICANT CHANGE UP (ref 5–17)
ANISOCYTOSIS BLD QL: SLIGHT — SIGNIFICANT CHANGE UP
BASOPHILS # BLD AUTO: 0 K/UL — SIGNIFICANT CHANGE UP (ref 0–0.2)
BASOPHILS NFR BLD AUTO: 0 % — SIGNIFICANT CHANGE UP (ref 0–2)
BUN SERPL-MCNC: 16.8 MG/DL — SIGNIFICANT CHANGE UP (ref 8–20)
CALCIUM SERPL-MCNC: 9.4 MG/DL — SIGNIFICANT CHANGE UP (ref 8.4–10.5)
CHLORIDE SERPL-SCNC: 103 MMOL/L — SIGNIFICANT CHANGE UP (ref 96–108)
CO2 SERPL-SCNC: 24 MMOL/L — SIGNIFICANT CHANGE UP (ref 22–29)
CREAT SERPL-MCNC: 0.7 MG/DL — SIGNIFICANT CHANGE UP (ref 0.5–1.3)
EGFR: 90 ML/MIN/1.73M2 — SIGNIFICANT CHANGE UP
EGFR: 90 ML/MIN/1.73M2 — SIGNIFICANT CHANGE UP
EOSINOPHIL # BLD AUTO: 0 K/UL — SIGNIFICANT CHANGE UP (ref 0–0.5)
EOSINOPHIL NFR BLD AUTO: 0 % — SIGNIFICANT CHANGE UP (ref 0–6)
GIANT PLATELETS BLD QL SMEAR: PRESENT — SIGNIFICANT CHANGE UP
GLUCOSE SERPL-MCNC: 153 MG/DL — HIGH (ref 70–99)
HCT VFR BLD CALC: 34.3 % — LOW (ref 34.5–45)
HGB BLD-MCNC: 10.9 G/DL — LOW (ref 11.5–15.5)
HYPOCHROMIA BLD QL: SLIGHT — SIGNIFICANT CHANGE UP
LYMPHOCYTES # BLD AUTO: 0.09 K/UL — LOW (ref 1–3.3)
LYMPHOCYTES # BLD AUTO: 0.9 % — LOW (ref 13–44)
MANUAL SMEAR VERIFICATION: SIGNIFICANT CHANGE UP
MCHC RBC-ENTMCNC: 31.2 PG — SIGNIFICANT CHANGE UP (ref 27–34)
MCHC RBC-ENTMCNC: 31.8 GM/DL — LOW (ref 32–36)
MCV RBC AUTO: 98.3 FL — SIGNIFICANT CHANGE UP (ref 80–100)
MICROCYTES BLD QL: SLIGHT — SIGNIFICANT CHANGE UP
MONOCYTES # BLD AUTO: 0.16 K/UL — SIGNIFICANT CHANGE UP (ref 0–0.9)
MONOCYTES NFR BLD AUTO: 1.7 % — LOW (ref 2–14)
NEUTROPHILS # BLD AUTO: 9.28 K/UL — HIGH (ref 1.8–7.4)
NEUTROPHILS NFR BLD AUTO: 97.4 % — HIGH (ref 43–77)
OVALOCYTES BLD QL SMEAR: SLIGHT — SIGNIFICANT CHANGE UP
PLAT MORPH BLD: NORMAL — SIGNIFICANT CHANGE UP
PLATELET # BLD AUTO: 198 K/UL — SIGNIFICANT CHANGE UP (ref 150–400)
POIKILOCYTOSIS BLD QL AUTO: SLIGHT — SIGNIFICANT CHANGE UP
POLYCHROMASIA BLD QL SMEAR: SLIGHT — SIGNIFICANT CHANGE UP
POTASSIUM SERPL-MCNC: 4.4 MMOL/L — SIGNIFICANT CHANGE UP (ref 3.5–5.3)
POTASSIUM SERPL-SCNC: 4.4 MMOL/L — SIGNIFICANT CHANGE UP (ref 3.5–5.3)
RBC # BLD: 3.49 M/UL — LOW (ref 3.8–5.2)
RBC # FLD: 13.3 % — SIGNIFICANT CHANGE UP (ref 10.3–14.5)
RBC BLD AUTO: ABNORMAL
SMUDGE CELLS # BLD: PRESENT — SIGNIFICANT CHANGE UP
SODIUM SERPL-SCNC: 139 MMOL/L — SIGNIFICANT CHANGE UP (ref 135–145)
WBC # BLD: 9.53 K/UL — SIGNIFICANT CHANGE UP (ref 3.8–10.5)
WBC # FLD AUTO: 9.53 K/UL — SIGNIFICANT CHANGE UP (ref 3.8–10.5)

## 2024-09-07 PROCEDURE — 86901 BLOOD TYPING SEROLOGIC RH(D): CPT

## 2024-09-07 PROCEDURE — 86900 BLOOD TYPING SEROLOGIC ABO: CPT

## 2024-09-07 PROCEDURE — 82962 GLUCOSE BLOOD TEST: CPT

## 2024-09-07 PROCEDURE — 85025 COMPLETE CBC W/AUTO DIFF WBC: CPT

## 2024-09-07 PROCEDURE — 80048 BASIC METABOLIC PNL TOTAL CA: CPT

## 2024-09-07 PROCEDURE — C1889: CPT

## 2024-09-07 PROCEDURE — 97167 OT EVAL HIGH COMPLEX 60 MIN: CPT

## 2024-09-07 PROCEDURE — 63035 LAMOT DCMPRN NRV RT EA ADDL: CPT

## 2024-09-07 PROCEDURE — 86850 RBC ANTIBODY SCREEN: CPT

## 2024-09-07 PROCEDURE — 63030 LAMOT DCMPRN NRV RT 1 LMBR: CPT

## 2024-09-07 PROCEDURE — C9399: CPT

## 2024-09-07 PROCEDURE — 36415 COLL VENOUS BLD VENIPUNCTURE: CPT

## 2024-09-07 RX ORDER — OXYCODONE HYDROCHLORIDE AND ACETAMINOPHEN 10; 325 MG/1; MG/1
1 TABLET ORAL
Refills: 0
Start: 2024-09-07

## 2024-09-07 RX ORDER — ALBUTEROL SULFATE 2.5 MG/3ML
3 VIAL, NEBULIZER (ML) INHALATION
Refills: 0 | DISCHARGE

## 2024-09-07 RX ORDER — OXYCODONE HYDROCHLORIDE AND ACETAMINOPHEN 10; 325 MG/1; MG/1
1 TABLET ORAL
Qty: 6 | Refills: 0
Start: 2024-09-07 | End: 2024-09-08

## 2024-09-07 RX ORDER — CYCLOBENZAPRINE HYDROCHLORIDE 15 MG/1
1 CAPSULE, EXTENDED RELEASE ORAL
Qty: 42 | Refills: 0
Start: 2024-09-07 | End: 2024-09-20

## 2024-09-07 RX ORDER — ACETAMINOPHEN 500 MG/5ML
2 LIQUID (ML) ORAL
Qty: 0 | Refills: 0 | DISCHARGE
Start: 2024-09-07

## 2024-09-07 RX ORDER — LISINOPRIL 5 MG/1
1 TABLET ORAL
Qty: 0 | Refills: 0 | DISCHARGE
Start: 2024-09-07

## 2024-09-07 RX ORDER — ALBUTEROL SULFATE 2.5 MG/3ML
2 VIAL, NEBULIZER (ML) INHALATION
Qty: 0 | Refills: 0 | DISCHARGE
Start: 2024-09-07

## 2024-09-07 RX ORDER — METHYLPREDNISOLONE ACETATE 80 MG/ML
1 INJECTION, SUSPENSION INTRA-ARTICULAR; INTRALESIONAL; INTRAMUSCULAR; SOFT TISSUE
Qty: 1 | Refills: 0
Start: 2024-09-07

## 2024-09-07 RX ADMIN — CYCLOBENZAPRINE HYDROCHLORIDE 10 MILLIGRAM(S): 15 CAPSULE, EXTENDED RELEASE ORAL at 05:44

## 2024-09-07 RX ADMIN — Medication 650 MILLIGRAM(S): at 11:29

## 2024-09-07 RX ADMIN — LISINOPRIL 40 MILLIGRAM(S): 5 TABLET ORAL at 05:43

## 2024-09-07 RX ADMIN — METOPROLOL SUCCINATE 25 MILLIGRAM(S): 50 TABLET, EXTENDED RELEASE ORAL at 05:44

## 2024-09-07 RX ADMIN — CYCLOBENZAPRINE HYDROCHLORIDE 10 MILLIGRAM(S): 15 CAPSULE, EXTENDED RELEASE ORAL at 14:11

## 2024-09-07 RX ADMIN — Medication 650 MILLIGRAM(S): at 00:16

## 2024-09-07 RX ADMIN — Medication 650 MILLIGRAM(S): at 05:44

## 2024-09-07 RX ADMIN — Medication 650 MILLIGRAM(S): at 12:59

## 2024-09-07 RX ADMIN — MONTELUKAST SODIUM 10 MILLIGRAM(S): 10 TABLET ORAL at 11:30

## 2024-09-07 RX ADMIN — Medication 2000 MILLIGRAM(S): at 03:56

## 2024-09-07 RX ADMIN — Medication 75 MICROGRAM(S): at 05:44

## 2024-09-07 RX ADMIN — DEXAMETHASONE 4 MILLIGRAM(S): 0.5 TABLET ORAL at 11:30

## 2024-09-07 RX ADMIN — Medication 650 MILLIGRAM(S): at 06:41

## 2024-09-07 RX ADMIN — DEXAMETHASONE 4 MILLIGRAM(S): 0.5 TABLET ORAL at 05:43

## 2024-09-07 RX ADMIN — Medication 40 MILLIGRAM(S): at 05:43

## 2024-09-14 ENCOUNTER — EMERGENCY (EMERGENCY)
Facility: HOSPITAL | Age: 79
LOS: 1 days | End: 2024-09-14
Attending: EMERGENCY MEDICINE
Payer: MEDICARE

## 2024-09-14 VITALS
SYSTOLIC BLOOD PRESSURE: 107 MMHG | DIASTOLIC BLOOD PRESSURE: 60 MMHG | HEART RATE: 93 BPM | RESPIRATION RATE: 20 BRPM | OXYGEN SATURATION: 98 % | TEMPERATURE: 98 F

## 2024-09-14 VITALS
HEIGHT: 65 IN | DIASTOLIC BLOOD PRESSURE: 44 MMHG | RESPIRATION RATE: 20 BRPM | OXYGEN SATURATION: 95 % | TEMPERATURE: 98 F | HEART RATE: 55 BPM | WEIGHT: 126.1 LBS | SYSTOLIC BLOOD PRESSURE: 77 MMHG

## 2024-09-14 PROBLEM — I10 ESSENTIAL (PRIMARY) HYPERTENSION: Chronic | Status: ACTIVE | Noted: 2024-09-06

## 2024-09-14 PROBLEM — J45.909 UNSPECIFIED ASTHMA, UNCOMPLICATED: Chronic | Status: ACTIVE | Noted: 2024-09-06

## 2024-09-14 PROBLEM — J44.9 CHRONIC OBSTRUCTIVE PULMONARY DISEASE, UNSPECIFIED: Chronic | Status: ACTIVE | Noted: 2024-09-06

## 2024-09-14 PROBLEM — E78.5 HYPERLIPIDEMIA, UNSPECIFIED: Chronic | Status: ACTIVE | Noted: 2024-09-06

## 2024-09-14 LAB
BASOPHILS # BLD AUTO: 0.03 K/UL — SIGNIFICANT CHANGE UP (ref 0–0.2)
BASOPHILS NFR BLD AUTO: 0.2 % — SIGNIFICANT CHANGE UP (ref 0–2)
EOSINOPHIL # BLD AUTO: 0.27 K/UL — SIGNIFICANT CHANGE UP (ref 0–0.5)
EOSINOPHIL NFR BLD AUTO: 1.8 % — SIGNIFICANT CHANGE UP (ref 0–6)
HCT VFR BLD CALC: 39.4 % — SIGNIFICANT CHANGE UP (ref 34.5–45)
HGB BLD-MCNC: 12.9 G/DL — SIGNIFICANT CHANGE UP (ref 11.5–15.5)
IMM GRANULOCYTES NFR BLD AUTO: 1 % — HIGH (ref 0–0.9)
LYMPHOCYTES # BLD AUTO: 0.8 K/UL — LOW (ref 1–3.3)
LYMPHOCYTES # BLD AUTO: 5.4 % — LOW (ref 13–44)
MCHC RBC-ENTMCNC: 31.7 PG — SIGNIFICANT CHANGE UP (ref 27–34)
MCHC RBC-ENTMCNC: 32.7 GM/DL — SIGNIFICANT CHANGE UP (ref 32–36)
MCV RBC AUTO: 96.8 FL — SIGNIFICANT CHANGE UP (ref 80–100)
MONOCYTES # BLD AUTO: 0.97 K/UL — HIGH (ref 0–0.9)
MONOCYTES NFR BLD AUTO: 6.6 % — SIGNIFICANT CHANGE UP (ref 2–14)
NEUTROPHILS # BLD AUTO: 12.47 K/UL — HIGH (ref 1.8–7.4)
NEUTROPHILS NFR BLD AUTO: 85 % — HIGH (ref 43–77)
PLATELET # BLD AUTO: 293 K/UL — SIGNIFICANT CHANGE UP (ref 150–400)
RBC # BLD: 4.07 M/UL — SIGNIFICANT CHANGE UP (ref 3.8–5.2)
RBC # FLD: 13.6 % — SIGNIFICANT CHANGE UP (ref 10.3–14.5)
T3 SERPL-MCNC: 92 NG/DL — SIGNIFICANT CHANGE UP (ref 80–200)
T4 AB SER-ACNC: 9.5 UG/DL — SIGNIFICANT CHANGE UP (ref 4.5–12)
TROPONIN T, HIGH SENSITIVITY RESULT: 29 NG/L — SIGNIFICANT CHANGE UP (ref 0–51)
TSH SERPL-MCNC: 6.25 UIU/ML — HIGH (ref 0.27–4.2)
WBC # BLD: 14.68 K/UL — HIGH (ref 3.8–10.5)
WBC # FLD AUTO: 14.68 K/UL — HIGH (ref 3.8–10.5)

## 2024-09-14 PROCEDURE — 73630 X-RAY EXAM OF FOOT: CPT | Mod: 26,RT

## 2024-09-14 PROCEDURE — 70450 CT HEAD/BRAIN W/O DYE: CPT | Mod: MC

## 2024-09-14 PROCEDURE — 99285 EMERGENCY DEPT VISIT HI MDM: CPT | Mod: 25

## 2024-09-14 PROCEDURE — 99285 EMERGENCY DEPT VISIT HI MDM: CPT

## 2024-09-14 PROCEDURE — 72170 X-RAY EXAM OF PELVIS: CPT | Mod: 26

## 2024-09-14 PROCEDURE — 82962 GLUCOSE BLOOD TEST: CPT

## 2024-09-14 PROCEDURE — 70450 CT HEAD/BRAIN W/O DYE: CPT | Mod: 26,MC

## 2024-09-14 PROCEDURE — 85025 COMPLETE CBC W/AUTO DIFF WBC: CPT

## 2024-09-14 PROCEDURE — 84436 ASSAY OF TOTAL THYROXINE: CPT

## 2024-09-14 PROCEDURE — 72125 CT NECK SPINE W/O DYE: CPT | Mod: 26,MC

## 2024-09-14 PROCEDURE — 73562 X-RAY EXAM OF KNEE 3: CPT | Mod: 26,50

## 2024-09-14 PROCEDURE — 99283 EMERGENCY DEPT VISIT LOW MDM: CPT | Mod: GC

## 2024-09-14 PROCEDURE — 71045 X-RAY EXAM CHEST 1 VIEW: CPT

## 2024-09-14 PROCEDURE — 73562 X-RAY EXAM OF KNEE 3: CPT

## 2024-09-14 PROCEDURE — 93010 ELECTROCARDIOGRAM REPORT: CPT

## 2024-09-14 PROCEDURE — 73590 X-RAY EXAM OF LOWER LEG: CPT | Mod: 26,RT

## 2024-09-14 PROCEDURE — 72125 CT NECK SPINE W/O DYE: CPT | Mod: MC

## 2024-09-14 PROCEDURE — 73610 X-RAY EXAM OF ANKLE: CPT

## 2024-09-14 PROCEDURE — 71045 X-RAY EXAM CHEST 1 VIEW: CPT | Mod: 26

## 2024-09-14 PROCEDURE — 73590 X-RAY EXAM OF LOWER LEG: CPT

## 2024-09-14 PROCEDURE — 73630 X-RAY EXAM OF FOOT: CPT

## 2024-09-14 PROCEDURE — 84484 ASSAY OF TROPONIN QUANT: CPT

## 2024-09-14 PROCEDURE — 13121 CMPLX RPR S/A/L 2.6-7.5 CM: CPT | Mod: XU

## 2024-09-14 PROCEDURE — 93005 ELECTROCARDIOGRAM TRACING: CPT

## 2024-09-14 PROCEDURE — 84443 ASSAY THYROID STIM HORMONE: CPT

## 2024-09-14 PROCEDURE — 84480 ASSAY TRIIODOTHYRONINE (T3): CPT

## 2024-09-14 PROCEDURE — 73610 X-RAY EXAM OF ANKLE: CPT | Mod: 26,RT

## 2024-09-14 PROCEDURE — 72170 X-RAY EXAM OF PELVIS: CPT

## 2024-09-14 PROCEDURE — 36415 COLL VENOUS BLD VENIPUNCTURE: CPT

## 2024-09-14 PROCEDURE — 96374 THER/PROPH/DIAG INJ IV PUSH: CPT | Mod: XU

## 2024-09-14 PROCEDURE — 80053 COMPREHEN METABOLIC PANEL: CPT

## 2024-09-14 RX ORDER — CEFAZOLIN SODIUM IN 0.9 % NACL 3 G/100 ML
1000 INTRAVENOUS SOLUTION, PIGGYBACK (ML) INTRAVENOUS ONCE
Refills: 0 | Status: COMPLETED | OUTPATIENT
Start: 2024-09-14 | End: 2024-09-14

## 2024-09-14 RX ORDER — CEFAZOLIN SODIUM IN 0.9 % NACL 3 G/100 ML
1000 INTRAVENOUS SOLUTION, PIGGYBACK (ML) INTRAVENOUS ONCE
Refills: 0 | Status: DISCONTINUED | OUTPATIENT
Start: 2024-09-14 | End: 2024-09-14

## 2024-09-14 RX ORDER — ACETAMINOPHEN 500 MG/5ML
975 LIQUID (ML) ORAL ONCE
Refills: 0 | Status: COMPLETED | OUTPATIENT
Start: 2024-09-14 | End: 2024-09-14

## 2024-09-14 RX ADMIN — Medication 975 MILLIGRAM(S): at 13:54

## 2024-09-14 RX ADMIN — Medication 1000 MILLILITER(S): at 13:55

## 2024-09-14 RX ADMIN — Medication 1000 MILLIGRAM(S): at 13:54

## 2024-09-15 RX ORDER — CEPHALEXIN 250 MG/1
1 CAPSULE ORAL
Qty: 28 | Refills: 0
Start: 2024-09-15 | End: 2024-09-21

## 2024-09-16 ENCOUNTER — NON-APPOINTMENT (OUTPATIENT)
Age: 79
End: 2024-09-16

## 2024-09-17 ENCOUNTER — APPOINTMENT (OUTPATIENT)
Dept: TRAUMA SURGERY | Facility: CLINIC | Age: 79
End: 2024-09-17
Payer: MEDICARE

## 2024-09-17 VITALS
SYSTOLIC BLOOD PRESSURE: 107 MMHG | TEMPERATURE: 97.4 F | DIASTOLIC BLOOD PRESSURE: 70 MMHG | HEART RATE: 117 BPM | OXYGEN SATURATION: 98 % | RESPIRATION RATE: 16 BRPM

## 2024-09-17 PROCEDURE — 99203 OFFICE O/P NEW LOW 30 MIN: CPT

## 2024-09-18 PROBLEM — S81.811A LACERATION OF LEG, RIGHT: Status: ACTIVE | Noted: 2024-09-18

## 2024-09-18 NOTE — PHYSICAL EXAM
[Normal Breath Sounds] : Normal breath sounds [Normal Heart Sounds] : normal heart sounds [Purpura] : no purpura  [Petechiae] : no petechiae [Skin Ulcer] : no ulcer [Skin Induration] : no induration [Alert] : alert [Oriented to Person] : oriented to person [Oriented to Place] : oriented to place [Oriented to Time] : oriented to time [Calm] : calm [de-identified] : wdwn  female  in  nad [de-identified] : non icteric sclera  PERRLA EOMS intact  and  nl [de-identified] : trachea  midline [de-identified] : soft  no guarding [de-identified] : f/u  spine  surgeon   right  knee   from   painful to palpation no e janene  no erythema   nl pedal puylses b/l    [de-identified] : large  right  lower extremity  skin tear   with  tacking of skin back to area    skin  dusky in color   will most likely need debridement  next  week  and will remove sutures at thiat  time  no e janene no erythema  no  signs  of  cellultis

## 2024-09-18 NOTE — REVIEW OF SYSTEMS
[Joint Stiffness] : joint stiffness [Skin Wound] : skin wound [Negative] : Psychiatric [FreeTextEntry9] : right  knee     recent  back surgery [de-identified] : right  lower leg  skin tear

## 2024-09-18 NOTE — ASSESSMENT
[FreeTextEntry1] : RTC next  week  for  wound  check F/u PMD/Cardiologist/Neurosurgeon Use  non adherent  bandage  to area  of laceration  and  light cmpression Discussion with patient   All questions answered  Any acute symptoms and or concerns patient understands  to call back office  and  or  go  directly to [facility] ED

## 2024-09-18 NOTE — HISTORY OF PRESENT ILLNESS
[FreeTextEntry1] : Post Discharge Note: Post Discharge Note: - Reason for Follow-up: Other - Details: To clarify outpatient follow up for wounds and to send antibiotic - Result Summary: Called patient and inquired about her post discharge. Discussed with patient that she needs keflex antibiotics for her wound, and this will be sent to her preferred pharmacy which she informed me was [facility]. Prescription sent and pt instructed on taking 4 times a day for 7 days. Gave patient trauma clinic address and phone number to set up a follow up appointment with trauma team for Tuesday as trauma team recommended. Further instructed patient to ice her knees, elevate her extremities, and that she may take tylenol to help reduce pain and swelling in her knees. -  Patient presents  to [facility]  for  wound check /possible  suture removal   s/p  fall into stairs Patient  recently  had back  surgery  and  wearing  brace  and  utilizing walker   Patient  with multiple compliant   of  leg pain  back pain  and  BP issues    Patient  denies  any  headaches   no dizziness  no  blurry  vision

## 2024-09-24 ENCOUNTER — APPOINTMENT (OUTPATIENT)
Dept: TRAUMA SURGERY | Facility: CLINIC | Age: 79
End: 2024-09-24
Payer: MEDICARE

## 2024-09-24 VITALS
BODY MASS INDEX: 21.68 KG/M2 | DIASTOLIC BLOOD PRESSURE: 70 MMHG | SYSTOLIC BLOOD PRESSURE: 108 MMHG | OXYGEN SATURATION: 98 % | WEIGHT: 127 LBS | RESPIRATION RATE: 16 BRPM | TEMPERATURE: 97.7 F | HEIGHT: 64 IN | HEART RATE: 94 BPM

## 2024-09-24 PROCEDURE — 99213 OFFICE O/P EST LOW 20 MIN: CPT

## 2024-09-25 NOTE — PHYSICAL EXAM
[Normal Breath Sounds] : Normal breath sounds [Normal Heart Sounds] : normal heart sounds [Purpura] : no purpura  [Petechiae] : no petechiae [Skin Ulcer] : no ulcer [Skin Induration] : no induration [Alert] : alert [Oriented to Person] : oriented to person [Oriented to Place] : oriented to place [Oriented to Time] : oriented to time [Calm] : calm [de-identified] : wdwn  female  in  nad [de-identified] : non icteric sclera  PERRLA EOMS intact  and  nl [de-identified] : trachea  midline [de-identified] : soft  no guarding [de-identified] : f/u  spine  surgeon   right  knee   from   painful to palpation no e janene  no erythema    nl pedal puylses b/l    [de-identified] : large  right  lower extremity  skin tear   with  tacking of skin back to area    skin     dusky in color  sutures  removed  no edema no erythema  no  signs  of  cellulitis   skin flap serving  as natural bandage

## 2024-09-25 NOTE — HISTORY OF PRESENT ILLNESS
[FreeTextEntry1] : Post Discharge Note: Post Discharge Note: - Reason for Follow-up: Other - Details: To clarify outpatient follow up for wounds and to send antibiotic - Result Summary: Called patient and inquired about her post discharge. Discussed with patient that she needs keflex antibiotics for her wound, and this will be sent to her preferred pharmacy which she informed me was [facility]. Prescription sent and pt instructed on taking 4 times a day for 7 days. Gave patient trauma clinic address and phone number to set up a follow up appointment with trauma team for Tuesday as trauma team recommended. Further instructed patient to ice her knees, elevate her extremities, and that she may take tylenol to help reduce pain and swelling in her knees. -  Patient presents  to [facility]  for  wound check /possible  suture removal   s/p  fall into stairs Patient  recently  had back  surgery  and  wearing  brace  and  utilizing walker   Patient  with multiple compliant   of  leg pain  back pain  and  BP issues      Patient  denies  any  headaches   no dizziness  no  blurry  vision  Son at bedside  for  entire exam

## 2024-09-25 NOTE — ASSESSMENT
[FreeTextEntry1] : RTC next  week  for  wound  check F/u PMD/Cardiologist/Neurosurgeon Use  non adherent  bandage  to area  of laceration  and  light compression Can shower Discussion with patient   All questions answered  Any acute symptoms and or concerns patient understands  to call back office  and  or  go  directly to [facility] ED

## 2024-09-25 NOTE — REVIEW OF SYSTEMS
[Joint Stiffness] : joint stiffness [Skin Wound] : skin wound [Negative] : Psychiatric [FreeTextEntry9] : right  knee     recent  back surgery [de-identified] : right  lower leg  skin tear

## 2024-10-01 ENCOUNTER — APPOINTMENT (OUTPATIENT)
Dept: TRAUMA SURGERY | Facility: CLINIC | Age: 79
End: 2024-10-01
Payer: MEDICARE

## 2024-10-01 VITALS
HEIGHT: 64 IN | WEIGHT: 126 LBS | SYSTOLIC BLOOD PRESSURE: 100 MMHG | HEART RATE: 76 BPM | BODY MASS INDEX: 21.51 KG/M2 | DIASTOLIC BLOOD PRESSURE: 60 MMHG | RESPIRATION RATE: 16 BRPM | TEMPERATURE: 97.6 F | OXYGEN SATURATION: 96 %

## 2024-10-01 DIAGNOSIS — S81.811A LACERATION W/OUT FOREIGN BODY, RIGHT LOWER LEG, INITIAL ENCOUNTER: ICD-10-CM

## 2024-10-01 PROCEDURE — 99203 OFFICE O/P NEW LOW 30 MIN: CPT

## 2024-10-04 NOTE — REVIEW OF SYSTEMS
[Joint Stiffness] : joint stiffness [Skin Wound] : skin wound [Negative] : Psychiatric [FreeTextEntry9] : right  knee     recent  back surgery [de-identified] : right  lower leg  skin tear

## 2024-10-04 NOTE — PHYSICAL EXAM
[Normal Breath Sounds] : Normal breath sounds [Normal Heart Sounds] : normal heart sounds [Purpura] : no purpura  [Petechiae] : no petechiae [Skin Ulcer] : no ulcer [Skin Induration] : no induration [Alert] : alert [Oriented to Person] : oriented to person [Oriented to Place] : oriented to place [Oriented to Time] : oriented to time [Calm] : calm [de-identified] : wdwn  female  in  nad [de-identified] : non icteric sclera  PERRLA EOMS intact  and  nl [de-identified] : trachea  midline [de-identified] : soft  no guarding [de-identified] : f/u  spine  surgeon   right  knee   from   painful to palpation no e janene  no erythema    nl pedal puylses b/l    [de-identified] : large  right  lower extremity  skin tear      skin                                                                                                                                                                                                                                                                                                                                                                           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

## 2024-10-04 NOTE — ASSESSMENT
[FreeTextEntry1] : RTC  2   weeks  for  wound  check F/u PMD/Cardiologist/Neurosurgeon Use  non adherent  bandage  to area  of laceration  and  light compression Can shower any  signs  of  cellulitis  call back the  office to be examined Discussion with patient   All questions answered  Any acute symptoms and or concerns patient understands  to call back office  and  or  go  directly to [facility] ED

## 2024-10-04 NOTE — HISTORY OF PRESENT ILLNESS
[FreeTextEntry1] : Post Discharge Note: Post Discharge Note: - Reason for Follow-up: Other - Details: To clarify outpatient follow up for wounds and to send antibiotic - Result Summary: Called patient and inquired about her post discharge. Discussed with patient that she needs keflex antibiotics for her wound, and this will be sent to her preferred pharmacy which she informed me was [facility]. Prescription sent and pt instructed on taking 4 times a day for 7 days. Gave patient trauma clinic address and phone number to set up a follow up appointment with trauma team for Tuesday as trauma team recommended. Further instructed patient to ice her knees, elevate her extremities, and that she may take tylenol to help reduce pain and swelling in her knees. -  Patient presents  to [facility]  for  wound check    s/p  fall into stairs       Patient  denies  any  headaches   no dizziness  no  blurry  vision Patient denies any acute physical complaints  Friend at bedside  for  entire exam

## 2024-10-15 ENCOUNTER — APPOINTMENT (OUTPATIENT)
Dept: TRAUMA SURGERY | Facility: CLINIC | Age: 79
End: 2024-10-15
Payer: MEDICARE

## 2024-10-15 VITALS
SYSTOLIC BLOOD PRESSURE: 108 MMHG | RESPIRATION RATE: 16 BRPM | BODY MASS INDEX: 20.83 KG/M2 | WEIGHT: 122 LBS | TEMPERATURE: 97.9 F | HEART RATE: 76 BPM | DIASTOLIC BLOOD PRESSURE: 56 MMHG | HEIGHT: 64 IN | OXYGEN SATURATION: 96 %

## 2024-10-15 DIAGNOSIS — S81.802S UNSPECIFIED OPEN WOUND, LEFT LOWER LEG, SEQUELA: ICD-10-CM

## 2024-10-15 PROCEDURE — 99213 OFFICE O/P EST LOW 20 MIN: CPT

## 2024-10-29 ENCOUNTER — NON-APPOINTMENT (OUTPATIENT)
Age: 79
End: 2024-10-29

## 2024-10-29 ENCOUNTER — APPOINTMENT (OUTPATIENT)
Dept: TRAUMA SURGERY | Facility: CLINIC | Age: 79
End: 2024-10-29
Payer: MEDICARE

## 2024-10-29 VITALS
DIASTOLIC BLOOD PRESSURE: 59 MMHG | OXYGEN SATURATION: 97 % | RESPIRATION RATE: 14 BRPM | WEIGHT: 125 LBS | SYSTOLIC BLOOD PRESSURE: 120 MMHG | TEMPERATURE: 97.9 F | BODY MASS INDEX: 21.34 KG/M2 | HEIGHT: 64 IN | HEART RATE: 68 BPM

## 2024-10-29 DIAGNOSIS — S81.802S UNSPECIFIED OPEN WOUND, LEFT LOWER LEG, SEQUELA: ICD-10-CM

## 2024-10-29 PROCEDURE — 99212 OFFICE O/P EST SF 10 MIN: CPT

## 2024-11-12 ENCOUNTER — APPOINTMENT (OUTPATIENT)
Dept: TRAUMA SURGERY | Facility: CLINIC | Age: 79
End: 2024-11-12
Payer: MEDICARE

## 2024-11-12 VITALS
DIASTOLIC BLOOD PRESSURE: 68 MMHG | WEIGHT: 126 LBS | OXYGEN SATURATION: 98 % | BODY MASS INDEX: 21.51 KG/M2 | RESPIRATION RATE: 14 BRPM | TEMPERATURE: 98.2 F | SYSTOLIC BLOOD PRESSURE: 121 MMHG | HEIGHT: 64 IN | HEART RATE: 80 BPM

## 2024-11-12 DIAGNOSIS — S81.811A LACERATION W/OUT FOREIGN BODY, RIGHT LOWER LEG, INITIAL ENCOUNTER: ICD-10-CM

## 2024-11-12 DIAGNOSIS — S81.802S UNSPECIFIED OPEN WOUND, LEFT LOWER LEG, SEQUELA: ICD-10-CM

## 2024-11-12 PROCEDURE — 99212 OFFICE O/P EST SF 10 MIN: CPT

## 2024-12-03 ENCOUNTER — APPOINTMENT (OUTPATIENT)
Dept: TRAUMA SURGERY | Facility: CLINIC | Age: 79
End: 2024-12-03
Payer: MEDICARE

## 2024-12-03 ENCOUNTER — APPOINTMENT (OUTPATIENT)
Dept: VASCULAR SURGERY | Facility: CLINIC | Age: 79
End: 2024-12-03
Payer: MEDICARE

## 2024-12-03 VITALS
HEIGHT: 64 IN | HEART RATE: 74 BPM | RESPIRATION RATE: 16 BRPM | DIASTOLIC BLOOD PRESSURE: 67 MMHG | BODY MASS INDEX: 21.68 KG/M2 | OXYGEN SATURATION: 95 % | TEMPERATURE: 98.2 F | WEIGHT: 127 LBS | SYSTOLIC BLOOD PRESSURE: 137 MMHG

## 2024-12-03 DIAGNOSIS — I89.0 LYMPHEDEMA, NOT ELSEWHERE CLASSIFIED: ICD-10-CM

## 2024-12-03 DIAGNOSIS — M79.89 OTHER SPECIFIED SOFT TISSUE DISORDERS: ICD-10-CM

## 2024-12-03 DIAGNOSIS — S81.802S UNSPECIFIED OPEN WOUND, LEFT LOWER LEG, SEQUELA: ICD-10-CM

## 2024-12-03 DIAGNOSIS — S81.811A LACERATION W/OUT FOREIGN BODY, RIGHT LOWER LEG, INITIAL ENCOUNTER: ICD-10-CM

## 2024-12-03 PROCEDURE — 99203 OFFICE O/P NEW LOW 30 MIN: CPT

## 2024-12-03 PROCEDURE — 99212 OFFICE O/P EST SF 10 MIN: CPT

## 2024-12-03 PROCEDURE — 93971 EXTREMITY STUDY: CPT

## 2025-01-07 ENCOUNTER — APPOINTMENT (OUTPATIENT)
Dept: VASCULAR SURGERY | Facility: CLINIC | Age: 80
End: 2025-01-07
Payer: MEDICARE

## 2025-01-07 VITALS
WEIGHT: 124 LBS | OXYGEN SATURATION: 97 % | RESPIRATION RATE: 16 BRPM | TEMPERATURE: 97.8 F | DIASTOLIC BLOOD PRESSURE: 79 MMHG | BODY MASS INDEX: 21.17 KG/M2 | HEART RATE: 70 BPM | HEIGHT: 64 IN | SYSTOLIC BLOOD PRESSURE: 165 MMHG

## 2025-01-07 VITALS — DIASTOLIC BLOOD PRESSURE: 75 MMHG | SYSTOLIC BLOOD PRESSURE: 146 MMHG

## 2025-01-07 DIAGNOSIS — I89.0 LYMPHEDEMA, NOT ELSEWHERE CLASSIFIED: ICD-10-CM

## 2025-01-07 DIAGNOSIS — S81.811A LACERATION W/OUT FOREIGN BODY, RIGHT LOWER LEG, INITIAL ENCOUNTER: ICD-10-CM

## 2025-01-07 PROCEDURE — 99213 OFFICE O/P EST LOW 20 MIN: CPT

## 2025-02-12 ENCOUNTER — APPOINTMENT (OUTPATIENT)
Dept: PULMONOLOGY | Facility: CLINIC | Age: 80
End: 2025-02-12
Payer: MEDICARE

## 2025-02-12 VITALS
HEART RATE: 84 BPM | WEIGHT: 122 LBS | DIASTOLIC BLOOD PRESSURE: 72 MMHG | BODY MASS INDEX: 20.83 KG/M2 | HEIGHT: 64 IN | RESPIRATION RATE: 16 BRPM | SYSTOLIC BLOOD PRESSURE: 122 MMHG | OXYGEN SATURATION: 97 %

## 2025-02-12 DIAGNOSIS — J44.9 CHRONIC OBSTRUCTIVE PULMONARY DISEASE, UNSPECIFIED: ICD-10-CM

## 2025-02-12 DIAGNOSIS — Z87.891 PERSONAL HISTORY OF NICOTINE DEPENDENCE: ICD-10-CM

## 2025-02-12 DIAGNOSIS — J45.909 UNSPECIFIED ASTHMA, UNCOMPLICATED: ICD-10-CM

## 2025-02-12 DIAGNOSIS — R06.02 SHORTNESS OF BREATH: ICD-10-CM

## 2025-02-12 DIAGNOSIS — R91.8 OTHER NONSPECIFIC ABNORMAL FINDING OF LUNG FIELD: ICD-10-CM

## 2025-02-12 PROCEDURE — 99214 OFFICE O/P EST MOD 30 MIN: CPT

## 2025-02-12 PROCEDURE — G2211 COMPLEX E/M VISIT ADD ON: CPT

## 2025-02-12 RX ORDER — UMECLIDINIUM BROMIDE AND VILANTEROL TRIFENATATE 62.5; 25 UG/1; UG/1
62.5-25 POWDER RESPIRATORY (INHALATION) DAILY
Qty: 3 | Refills: 3 | Status: ACTIVE | COMMUNITY
Start: 2025-02-12 | End: 1900-01-01

## 2025-02-12 RX ORDER — METHYLPREDNISOLONE 4 MG/1
TABLET ORAL
Refills: 0 | Status: ACTIVE | COMMUNITY

## 2025-03-03 ENCOUNTER — APPOINTMENT (OUTPATIENT)
Age: 80
End: 2025-03-03
Payer: MEDICARE

## 2025-03-03 VITALS
HEART RATE: 70 BPM | OXYGEN SATURATION: 99 % | BODY MASS INDEX: 20.83 KG/M2 | HEIGHT: 65 IN | DIASTOLIC BLOOD PRESSURE: 70 MMHG | WEIGHT: 125 LBS | SYSTOLIC BLOOD PRESSURE: 154 MMHG | TEMPERATURE: 98.1 F

## 2025-03-03 DIAGNOSIS — M54.9 DORSALGIA, UNSPECIFIED: ICD-10-CM

## 2025-03-03 DIAGNOSIS — M54.2 CERVICALGIA: ICD-10-CM

## 2025-03-03 DIAGNOSIS — M85.80 OTHER SPECIFIED DISORDERS OF BONE DENSITY AND STRUCTURE, UNSPECIFIED SITE: ICD-10-CM

## 2025-03-03 DIAGNOSIS — Z82.61 FAMILY HISTORY OF ARTHRITIS: ICD-10-CM

## 2025-03-03 DIAGNOSIS — Z86.39 PERSONAL HISTORY OF OTHER ENDOCRINE, NUTRITIONAL AND METABOLIC DISEASE: ICD-10-CM

## 2025-03-03 DIAGNOSIS — Z82.5 FAMILY HISTORY OF ASTHMA AND OTHER CHRONIC LOWER RESPIRATORY DISEASES: ICD-10-CM

## 2025-03-03 DIAGNOSIS — Z98.890 OTHER SPECIFIED POSTPROCEDURAL STATES: ICD-10-CM

## 2025-03-03 DIAGNOSIS — Z86.79 PERSONAL HISTORY OF OTHER DISEASES OF THE CIRCULATORY SYSTEM: ICD-10-CM

## 2025-03-03 DIAGNOSIS — G89.29 LOW BACK PAIN, UNSPECIFIED: ICD-10-CM

## 2025-03-03 DIAGNOSIS — M54.16 RADICULOPATHY, LUMBAR REGION: ICD-10-CM

## 2025-03-03 DIAGNOSIS — M54.50 LOW BACK PAIN, UNSPECIFIED: ICD-10-CM

## 2025-03-03 PROCEDURE — 99204 OFFICE O/P NEW MOD 45 MIN: CPT

## 2025-03-03 PROCEDURE — G2211 COMPLEX E/M VISIT ADD ON: CPT

## 2025-03-13 ENCOUNTER — APPOINTMENT (OUTPATIENT)
Dept: PHYSICAL MEDICINE AND REHAB | Facility: AMBULATORY SURGERY CENTER | Age: 80
End: 2025-03-13
Payer: MEDICARE

## 2025-03-13 PROCEDURE — 64483 NJX AA&/STRD TFRM EPI L/S 1: CPT | Mod: RT

## 2025-03-13 PROCEDURE — 64484 NJX AA&/STRD TFRM EPI L/S EA: CPT | Mod: RT

## 2025-03-26 ENCOUNTER — APPOINTMENT (OUTPATIENT)
Age: 80
End: 2025-03-26
Payer: MEDICARE

## 2025-03-26 DIAGNOSIS — M54.17 RADICULOPATHY, LUMBOSACRAL REGION: ICD-10-CM

## 2025-03-26 PROCEDURE — 99214 OFFICE O/P EST MOD 30 MIN: CPT

## 2025-03-26 PROCEDURE — G2211 COMPLEX E/M VISIT ADD ON: CPT

## 2025-04-10 ENCOUNTER — APPOINTMENT (OUTPATIENT)
Dept: PHYSICAL MEDICINE AND REHAB | Facility: AMBULATORY SURGERY CENTER | Age: 80
End: 2025-04-10
Payer: MEDICARE

## 2025-04-10 DIAGNOSIS — M54.17 RADICULOPATHY, LUMBOSACRAL REGION: ICD-10-CM

## 2025-04-10 PROCEDURE — 62323 NJX INTERLAMINAR LMBR/SAC: CPT

## 2025-04-23 ENCOUNTER — APPOINTMENT (OUTPATIENT)
Age: 80
End: 2025-04-23
Payer: MEDICARE

## 2025-04-23 VITALS
WEIGHT: 125 LBS | BODY MASS INDEX: 20.83 KG/M2 | SYSTOLIC BLOOD PRESSURE: 132 MMHG | DIASTOLIC BLOOD PRESSURE: 73 MMHG | OXYGEN SATURATION: 97 % | HEIGHT: 65 IN | HEART RATE: 71 BPM | TEMPERATURE: 98.5 F

## 2025-04-23 DIAGNOSIS — M54.16 RADICULOPATHY, LUMBAR REGION: ICD-10-CM

## 2025-04-23 PROCEDURE — 99213 OFFICE O/P EST LOW 20 MIN: CPT

## 2025-04-23 PROCEDURE — G2211 COMPLEX E/M VISIT ADD ON: CPT

## 2025-06-04 ENCOUNTER — APPOINTMENT (OUTPATIENT)
Age: 80
End: 2025-06-04
Payer: MEDICARE

## 2025-06-04 VITALS
TEMPERATURE: 98.3 F | HEIGHT: 65 IN | WEIGHT: 126 LBS | OXYGEN SATURATION: 97 % | DIASTOLIC BLOOD PRESSURE: 78 MMHG | HEART RATE: 70 BPM | BODY MASS INDEX: 20.99 KG/M2 | SYSTOLIC BLOOD PRESSURE: 135 MMHG

## 2025-06-04 DIAGNOSIS — M54.16 RADICULOPATHY, LUMBAR REGION: ICD-10-CM

## 2025-06-04 PROCEDURE — G2211 COMPLEX E/M VISIT ADD ON: CPT

## 2025-06-04 PROCEDURE — 99213 OFFICE O/P EST LOW 20 MIN: CPT

## 2025-07-02 ENCOUNTER — APPOINTMENT (OUTPATIENT)
Age: 80
End: 2025-07-02

## 2025-07-12 ENCOUNTER — NON-APPOINTMENT (OUTPATIENT)
Age: 80
End: 2025-07-12

## 2025-07-14 ENCOUNTER — APPOINTMENT (OUTPATIENT)
Dept: PULMONOLOGY | Facility: CLINIC | Age: 80
End: 2025-07-14
Payer: MEDICARE

## 2025-07-14 VITALS
WEIGHT: 128 LBS | RESPIRATION RATE: 16 BRPM | HEIGHT: 65 IN | SYSTOLIC BLOOD PRESSURE: 119 MMHG | HEART RATE: 75 BPM | OXYGEN SATURATION: 98 % | DIASTOLIC BLOOD PRESSURE: 60 MMHG | BODY MASS INDEX: 21.33 KG/M2

## 2025-07-14 PROCEDURE — G2211 COMPLEX E/M VISIT ADD ON: CPT

## 2025-07-14 PROCEDURE — 99215 OFFICE O/P EST HI 40 MIN: CPT

## 2025-07-14 RX ORDER — FLUTICASONE PROPIONATE AND SALMETEROL 500; 50 UG/1; UG/1
500-50 POWDER RESPIRATORY (INHALATION)
Refills: 0 | Status: ACTIVE | COMMUNITY

## 2025-07-14 RX ORDER — ALENDRONATE SODIUM 35 MG/1
TABLET ORAL
Refills: 0 | Status: ACTIVE | COMMUNITY

## (undated) DEVICE — ELCTR BOVIE PENCIL BLADE 10FT

## (undated) DEVICE — DRSG TELFA 3 X 4

## (undated) DEVICE — FRAZIER CONNECTING TUBE 2FT 5MM

## (undated) DEVICE — Device

## (undated) DEVICE — POSITIONER FOAM EGG CRATE ULNAR 2PCS (PINK)

## (undated) DEVICE — POSITIONER FOAM LAMINECTOMY ARM CRADLE (PINK)

## (undated) DEVICE — SUT PDS II 1 36" CT

## (undated) DEVICE — SOL IRR POUR H2O 1000ML

## (undated) DEVICE — DRAPE TOWEL 1000 SMALL 17" X 11"

## (undated) DEVICE — MIDAS REX MR8 MATCH HEAD FLUTED LG BORE 3MM X 14CM

## (undated) DEVICE — SUT MONOCRYL 4-0 27" PS-2 UNDYED

## (undated) DEVICE — PACK NEURO

## (undated) DEVICE — DRAPE TOWEL BLUE 17" X 24"

## (undated) DEVICE — CATH IV SAFE BC 18G X 1.16" (GREEN)

## (undated) DEVICE — DRAPE 3/4 SHEET WITH ADHESIVE 76" X 60"

## (undated) DEVICE — ELCTR GROUNDING PAD ADULT COVIDIEN

## (undated) DEVICE — WARMING BLANKET UPPER ADULT

## (undated) DEVICE — DRSG TOPIFOAM

## (undated) DEVICE — MIDAS REX LEGEND MATCH HEAD FLUTED LG BORE 3.0MM X 14CM

## (undated) DEVICE — SOL IRR POUR NS 0.9% 1000ML

## (undated) DEVICE — SUT PDS II 0 60" CTX

## (undated) DEVICE — VENODYNE/SCD SLEEVE CALF MEDIUM

## (undated) DEVICE — DRAPE XL SHEET 77X98"

## (undated) DEVICE — DRSG MEDIPORE DRESS IT 7-7/8 X 11"

## (undated) DEVICE — DRAPE 3/4 SHEET 52X76"

## (undated) DEVICE — SUT VICRYL PLUS 2-0 18" CP-2 UNDYED (POP-OFF)

## (undated) DEVICE — ELCTR ROCKER SWITCH PENCIL BLUE 10FT

## (undated) DEVICE — MARKING PEN W RULER

## (undated) DEVICE — DRAPE C ARM UNIVERSAL

## (undated) DEVICE — POSITIONER JACKSON TABLE HEADREST 7", CHEST, HIP, THIGH PADS, ARM CRADLE

## (undated) DEVICE — DRAPE INSTRUMENT POUCH 6.75" X 11"